# Patient Record
Sex: OTHER/UNKNOWN | Employment: FULL TIME | URBAN - NONMETROPOLITAN AREA
[De-identification: names, ages, dates, MRNs, and addresses within clinical notes are randomized per-mention and may not be internally consistent; named-entity substitution may affect disease eponyms.]

---

## 2017-04-05 ENCOUNTER — OFFICE VISIT (OUTPATIENT)
Dept: FAMILY MEDICINE | Facility: CLINIC | Age: 82
End: 2017-04-05

## 2017-04-05 DIAGNOSIS — Z02.89 HEALTH EXAMINATION OF DEFINED SUBPOPULATION: Primary | ICD-10-CM

## 2017-04-05 LAB
BILIRUB UR QL: NORMAL
CLARITY: CLEAR
COLOR UR: YELLOW
GLUCOSE URINE: NORMAL MG/DL
HGB UR QL: NORMAL
KETONES UR QL: NORMAL MG/DL
NITRITE UR QL STRIP: NORMAL
PH UR STRIP: 6 PH (ref 5–7)
PROT UR QL: NORMAL MG/DL
SP GR UR STRIP: 1 (ref 1–1.03)
SPECIMEN VOL UR: NORMAL ML
UROBILINOGEN UR QL STRIP: 0.2 EU/DL (ref 0.2–1)
WBC #/AREA URNS HPF: NORMAL /[HPF]

## 2017-04-05 PROCEDURE — 99499 UNLISTED E&M SERVICE: CPT | Performed by: FAMILY MEDICINE

## 2017-04-05 PROCEDURE — 81003 URINALYSIS AUTO W/O SCOPE: CPT | Performed by: FAMILY MEDICINE

## 2017-04-05 NOTE — MR AVS SNAPSHOT
"              After Visit Summary   2017    [employer]    MRN: [id number]           Patient Information     Date Of Birth          1900        Visit Information        Provider Department      2017 8:40 AM Julius Pena MD Formerly named [facility]        Today's Diagnoses     Health examination of defined subpopulation    -  1       Follow-ups after your visit        Who to contact     If you have questions or need follow up information about today's clinic visit or your schedule please contact [facility] directly at [phone].  Normal or non-critical lab and imaging results will be communicated to you by [employer], letter or phone within 4 business days after the clinic has received the results. If you do not hear from us within 7 days, please contact the clinic through [employer] or phone. If you have a critical or abnormal lab result, we will notify you by phone as soon as possible.  Submit refill requests through [employer] or call your pharmacy and they will forward the refill request to us. Please allow 3 business days for your refill to be completed.          Additional Information About Your Visit        MyChart Information     [employer] lets you send messages to your doctor, view your test results, renew your prescriptions, schedule appointments and more. To sign up, go to [email or web address] . Click on \"Log in\" on the left side of the screen, which will take you to the Welcome page. Then click on \"Sign up Now\" on the right side of the page.     You will be asked to enter the access code listed below, as well as some personal information. Please follow the directions to create your username and password.     Your access code is: [email or web address]  Expires: 2017 10:06 AM     Your access code will  in 90 days. If you need help or a new code, please call your [facility] or [phone].        Care EveryWhere ID     This is your Care EveryWhere " ID. This could be used by other organizations to access your [facility] medical records  [id number]         Blood Pressure from Last 3 Encounters:   No data found for BP    Weight from Last 3 Encounters:   No data found for Wt              We Performed the Following     OH U/A W/O MICRO        Primary Care Provider    Provider Unknown       No address on file        Thank you!     Thank you for choosing [facility]note 3]  for your care. Our goal is always to provide you with excellent care. Hearing back from our patients is one way we can continue to improve our services. Please take a few minutes to complete the written survey that you may receive in the mail after your visit with us. Thank you!             Your Updated Medication List - Protect others around you: Learn how to safely use, store and throw away your medicines at www.disposemymeds.org.      Notice  As of 4/5/2017 10:06 AM    You have not been prescribed any medications.

## 2017-04-05 NOTE — PROGRESS NOTES
"Form MCSA-5875 (revised 2015)                                       OMB No. [id number]  Expiration Date: 2018    MEDICAL EXAMINATION REPORT FORM  (FOR  MEDICAL CERTIFICATION)    SECTION 1.  Information (to be filled out by )    PERSONAL INFORMATION  Last Name: Tristan  First Name: Fritz Preston Initial: STELLA  : 1968      Age: 48        [address] Address: [address]   City: [place]   State/Province: [place]   Zip Code: [address]  's License Number: [id number]      Issuing State/Province: [place]       Phone: [phone]     Gender: male  E-mail (optional):   CLP/CDL Applicant Good*: yes   ID Verified by**:  License  Has your [employer]OT/FMCSA medical certificate ever been denied or issued for less than 2 years? NO   (*CLP/CDL Applicant/Good: See instructions for definitions)  (** ID verified By:Record what type of photo ID was used to verify the identity of the , e.g., CDL,'s license, passport)     HEALTH HISTORY  Have you ever had surgery? If \"yes\", please list and explain below. NO    Are you currently taking medications (prescription, over-the-counter, herbal remedies, diet supplements)? If \"yes,\" please describe below. NO    Do you have or have you ever had:     1. Head/ brain injuries or illnesses (e.g., concussion)    NO     2. Seizures, epilepsy?   NO     3. Eye problems (except glasses or contacts)?   NO     4. Ear and/or hearing problems   NO     5. Heart disease, heart attack; bypass, or other heart problems   NO     6. Pacemaker, stents, implantable devices, or other heart procedures   NO     7. High blood pressure   NO     8. High cholesterol   NO     9. Chronic (long-term) cough, shortness of breath, or other breathing problems   NO   10. Lung disease (e.g. asthma)   NO   11. Kidney problems, kidney stones, or pain/problems with urination   NO   12. Stomach, liver, or digestive problems   NO   13. Diabetes or blood sugar " "problems        Insulin Used?   NO    NO   14. Anxiety, depression, nervousness, other mental health problems   NO   15. Fainting or passing out   NO   16. Dizziness, headaches, numbness, tingling, or memory loss?   NO   17. Unexplained weight loss   NO   18. Stroke, mini-stroke (TIA), paralysis, or weakness   NO   19. Missing or limited use of arm, hand, finger, leg, foot, toe   NO   20. Neck or back problems   NO   21. Bone, muscle, joint or nerve problems   NO   22. Blood clots or bleeding problems   NO   23. Cancer   NO   24. Chronic (long-term) infection or other chronic diseases   NO   25. Sleep disorders, pauses in breathing while asleep, daytime sleepiness, loud snoring?   NO   26. Have you ever had a sleep test (e.g. sleep apnea)?   NO   27. Have you ever spent a night in the hospital?   NO   28. Have you ever had a broken bone?   YES   29. Have you ever used or do you now use tobacco?   YES   30. Do you currently drink alcohol?   YES   31. Have you used an illegal substance within the past two years?   NO   32. Have you ever failed a drug test or been dependent on an illegal substance?   NO     Other health condition(s) not described above: NO    Did you answer \"yes\" to any of questions 1-32?  If so, please comment further on those health conditions below: YES    Broken rib              'S SIGNATURE  I certify that the above information is accurate and complete. I understand that inaccurate, false or missing information may invalidate the examination and my Medical Examiner's Certificate, that submission of fraudulent or intentionally false information is a violation of 49CFR 390.35, and that submission of fraudulent or intentionally false information may subject me to civil or ciminal penalties under 49 .37 and 49  Appendices A and B.     's signature:____________________________        Date: 4/5/2017                                         Signature if printed   " "    Section 2. Examination Report (to be filled out by the medical examiner)      HEALTH HISTORY REVIEW  Review and discuss pertinent  answers and any available medical records. Comment on the 's responses to the \"health history\" questions that may affect the 's safe operation of a commercial motor vehicle (CMV).        couple beers a day.              TESTING     Pulse rate: 62     Pulse rhythm regular: YES  Height: 5 feet 10 inches  Weight: 180 pounds    Blood Pressure  Blood Pressure: 124 Systolic  82 Diastolic  Sitting yes  Second Reading (optional)   Other Testing if indicated           Urinalysis  Urinalysis is required. Numerical readings must be recorded.  Urine Specimen Specific Gravity Protein Blood Sugar    1.005 NEGATIVE NEGATIVE NEGATIVE   Protein, blood or sugar in the urine may be an indication for further testing to rule out any underlying medical problem.    Vision  Standard is at least 20/40 acuity (Snellen) in each eye with or without correction. At least 70  field of vision in horizontal meridian measured in each eye. The use of corrective lenses should be noted on the Medical Examiner's Certificate.    ACUITY UNCORRECTED CORRECTED HORIZONTAL FIELD OF VISION   Right Eye 20/20 N/A Greater than 70 degrees   Left Eye 20/20 N/A Greater than 70 degrees   Both Eyes 20/20 N/A      Applicant can recognize and distinguish among traffic control signals and devices showing red, green and aide colors? Yes    Monocular vision: No    Referred to ophthalmologist or optometrist?  No    Received documentation from ophthalmologist or optometrist?  No    HEARING   Standard: Must first perceive forced whispered voice at not less than 5 feet OR average hearing loss of less than or equal to 40 dB, in better ear (with or without hearing aid).    Check if hearing aid used for test:  Neither    Whispered voice test:    Record the distance from the individual at which a forced whispered voice can " first be heard:        Right Ear: greater than 5 feet                  Left Ear: greater than 5 feet             PHYSICAL EXAMINATION  The presence of a certain condition may not necessarily disqualify a , particularly if the condition is controlled adequately, is not likely to worsen, or is readily amenable to treatment. Even if a condition does not disqualify a , the Medical Examiner may consider deferring the  temporarily. Also, the  should be advised to take the necessary steps to correct the condition as soon as possible, particularly if neglecting the condition, could result in more serious illness that might affect driving.    Check the body systems for abnormalities.  BODY SYSTEM Normal or Abnormal   1. General  Normal   2. Skin Normal   3. Eyes Normal   4. Ears Normal   5. Mouth/throat Normal   6. Cardiovascular Normal   7. Lungs/chest Normal   8. Abdomen Normal   9. Genito-urinary System including hernias Normal   10. Back/Spine Normal   11. Extremities/joints Normal   12. Neurological system including reflexes Normal   13. Gait Normal   14. Vascular system Normal     Discuss any abnormal answers in detail in the space below and indicate whether it would affect the 's ability to operate a CMV. Enter applicable item number before each comment.                 Please complete only one of the following (Federal or State) Medical Examiner Determination sections:    MEDICAL EXAMINER DETERMINATION (Federal)  Use this section for examinations performed in accordance with the Federal Motor Carrier Safety Regulations (49 ..49):    Meets standards in 49 .41; qualifies for 2-year certificate            If the  meets the standards outlined in 49 .41, then complete a Medical Examiner's Certificate as stated in 49 .43(h), as appropriate.     I have performed this evaluation for certification. I have personally reviewed all available records and  recorded information pertaining to this evaluation, and attest that to the best of my knowledge, I believe it to be true and correct.    Medical Examiner's Signature: _________________________________________                                                                                   (if printed)    Medical Examiner's Name: Julius Pena MD  Medical Examiner's Address:   [address] [address]  [phone]  Dept: [phone]    Date Certificate Signed: 4/5/2017    Medical Examiner's State License, Certificate, or Registration Number: [id number]    Issuing State:  [place]    National Registry Number:  [id number]                Medical Examiner's Certificate Expiration Date: 4/5/2019                          Date submitted to registry: 4/5/2017  Submitted by: tracy

## 2018-12-13 ENCOUNTER — OFFICE VISIT (OUTPATIENT)
Dept: FAMILY MEDICINE | Facility: CLINIC | Age: 83
End: 2018-12-13

## 2018-12-13 DIAGNOSIS — Z02.89 HEALTH EXAMINATION OF DEFINED SUBPOPULATION: Primary | ICD-10-CM

## 2018-12-13 LAB
BILIRUB UR QL: NORMAL
CLARITY: CLEAR
COLOR UR: YELLOW
GLUCOSE URINE: 100 MG/DL
HGB UR QL: NORMAL
KETONES UR QL: NORMAL MG/DL
NITRITE UR QL STRIP: NORMAL
PH UR STRIP: 5.5 PH (ref 5–7)
PROT UR QL: NORMAL MG/DL
SP GR UR STRIP: 1.02 (ref 1–1.03)
SPECIMEN VOL UR: NORMAL ML
UROBILINOGEN UR QL STRIP: 0.2 EU/DL (ref 0.2–1)
WBC #/AREA URNS HPF: NORMAL /[HPF]

## 2018-12-13 PROCEDURE — 81003 URINALYSIS AUTO W/O SCOPE: CPT | Performed by: PHYSICIAN ASSISTANT

## 2018-12-13 PROCEDURE — 99499 UNLISTED E&M SERVICE: CPT | Performed by: PHYSICIAN ASSISTANT

## 2018-12-13 NOTE — PROGRESS NOTES
"Form MCSA-5875                                  [facility] No. [id number]  Expiration Date: 2019  MEDICAL EXAMINATION REPORT FORM  (FOR  MEDICAL CERTIFICATION)    SECTION 1.  Information (to be filled out by )    PERSONAL INFORMATION    Last Name: Lacho  First Name: Franck Wiggins Initial: D  : 1963      Age: 55        Street Address: [address] [place] [address]   City: [place]   State/Province: [place]   Zip Code: [address]  's License Number: [id number]      Issuing State/Province: [place]       Phone: [phone]     Gender: male  E-mail (optional): USHA  CLP/CDL Applicant Miller*: yes   ID Verified by**:  Checo Monreal CMA  Has your USDOT/FMCSA medical certificate ever been denied or issued for less than 2 years? YES     (*CLP/CDL Applicant/Miller: See instructions for definitions)  (** ID verified By:Record what type of photo ID was used to verify the identity of the , e.g., CDL,'s license, passport)       HEALTH HISTORY    Have you ever had surgery? If \"yes\", please list and explain below.  [ X ] Yes [  ]  No  [  ] Not Sure    Prostate cancer surgery     Are you currently taking medications (prescription, over-the-counter, herbal remedies, diet supplements)? If \"yes,\" please describe below.   [ X ] Yes [  ]  No  [  ] Not Sure    Generic Name Brand Name Tablet Size Instructions for use   Alprostadil (Vasodilator) EDEX 20 MCG 20 mcg by Intracavitary route as needed for erectile dysfunction (trimix standard PGE1 5.88ug/ papaverine HCL 18mg/ Phentolamine Mesylate 0.6 mg/ ML) use no more than 3 times per week      AmLODIPine Besylate NORVASC 10 MG Take 1 tablet (10 mg) by mouth daily      Aspirin ASA 81 MG Take  by mouth daily as needed.      Atorvastatin Calcium LIPITOR 40 MG Take 1 tablet (40 mg) by mouth daily      Blood Glucose Monitoring Suppl ACCU-CHEK COMPACT CARE KIT  Use once or twice daily as directed      Glimepiride AMARYL 4 MG TAKE 1 TABLET BY MOUTH " TWICE DAILY BEFORE BREAKFAST AND SUPPER      Glucose Blood ACCU-CHEK SMARTVIEW  TEST 1 TO 2 TIMES DAILY      Lancets DIABETIC STERILE LANCETS  1 Device daily.      Lisinopril PRINIVIL/ZESTRIL 40 MG TAKE 1 TABLET(40 MG) BY MOUTH DAILY      MetFORMIN HCl GLUCOPHAGE 1000 MG TAKE 1 TABLET(1000 MG) BY MOUTH TWICE DAILY BEFORE BREAKFAST AND DINNER WITH MEALS      Tadalafil CIALIS 20 MG Take 0.5-1 tablets (10-20 mg) by mouth daily as needed for erectile dysfunction Never use with nitroglycerin, terazosin or doxazosin.           HEALTH HISTORY (continued)          Do you have or have you ever had:                                                     Not  Yes    No     Sure                                                                          Not    Yes     No   Sure    1. Head/brain injuries or illness (e.g., concussion)  X     16. Dizziness, headaches, numbness, tingling, or memory loss  x       2. Seizures, epilepsy  X     17. Unexplained weight loss  x       3. Eye problems (except glasses or contacts)  X     18. Stroke, mini stroke (TIA), paralysis, or weakness  x       4. Ear and/or hearing problems  X     19. Missing or limited use of arm, hand, finger, leg, foot, toe  x       5. Heart disease, heart attack, bypass, or other heart problems  X     20. Neck or back problems  x       6. Pacemaker, stents, implantable devices, or other heart procedures  X     21. Bone, muscle, joint or nerve problems  x       7. High blood preassure x      22. Blood clots or bleeding problems  x       8. High cholesterol x      23. Cancer  x       9. Chronic (long term) cough, shortness of breath, or other breathing problems  x     24. Chronic (long term) infection or other chronic disease  x       10. Lung disease (e.g., asthma)  x     25. Sleep disorders, pauses in breathing while asleep, daytime sleepiness, loud snoring  x       11. Kidney problems, kidney stones, or pain/problems with urination  x     26. Have you ever had a  "sleep test? (e.g., sleep apnea)  x       12. Stomach, liver, or digestive problems  x     27. Have you ever spent the night in the hospital? x        13. Diabetes or blood sugar problems x      28. Have you ever had a broken bone? x              Insulin used  x     29. Have you ever used or do you now use tobacco?  x       14. Anxiety, depression, nervousness, other mental health problems  x     30. Do you currently drink alcohol?  x        15. Fainting or passing out  x     31.  Have you used an illegal substance within the past two years?   x         32. Have you ever failed a drug test or been dependent on an illegal substance?   x         Other health condition(s) not described above: [  ] Yes [ X ]  No  [  ] Not Sure         Did you answer \"yes\" to any of questions 1-32?  If so, please comment further on those health conditions below: [ X ] Yes [  ]  No  [  ] Not Sure    7. Takes high blood pressure medication  8. Takes high cholesterol medication  9. Takes diabetes medication and checks blood sugar            'S SIGNATURE  I certify that the above information is accurate and complete. I understand that inaccurate, false or missing information may invalidate the examination and my Medical Examiner's Certificate, that submission of fraudulent or intentionally false information is a violation of 49CFR 390.35, and that submission of fraudulent or intentionally false information may subject me to civil or ciminal penalties under 49 .37 and 49  Appendices A and B.     's signature:____________________________        Date: 12/13/2018                                         Signature if printed       Section 2. Examination Report (to be filled out by the medical examiner)      HEALTH HISTORY REVIEW  Review and discuss pertinent  answers and any available medical records. Comment on the 's responses to the \"health history\" questions that may affect the 's safe " operation of a commercial motor vehicle (CMV).        stable HTN, hyperlipidemia and DM on oral medications . Last A1C is 7.9 on 10/21/18  Conditions will not interfere with safe driving            TESTING     Pulse rate: 75     Pulse rhythm regular: YES  Height: 5 feet 8.5 inches  Weight: 222 pounds    Blood Pressure  Blood Pressure: 128 Systolic  87 Diastolic  Sitting YES  Second Reading (optional) none  Other Testing if indicated    none       Urinalysis  Urinalysis is required. Numerical readings must be recorded.  Urine Specimen Specific Gravity Protein Blood Sugar    1.025 NEGATIVE NEGATIVE NEGATIVE   Protein, blood or sugar in the urine may be an indication for further testing to rule out any underlying medical problem.    Vision  Standard is at least 20/40 acuity (Snellen) in each eye with or without correction. At least 70  field of vision in horizontal meridian measured in each eye. The use of corrective lenses should be noted on the Medical Examiner's Certificate.    ACUITY UNCORRECTED CORRECTED HORIZONTAL FIELD OF VISION   Right Eye N/A 20/20 Greater than 70 degrees   Left Eye N/A 20/25 Greater than 70 degrees   Both Eyes N/A 20/20      Applicant can recognize and distinguish among traffic control signals and devices showing red, green and winnie colors? Yes    Monocular vision: No    Referred to ophthalmologist or optometrist?  No    Received documentation from ophthalmologist or optometrist?  No    HEARING   Standard: Must first perceive forced whispered voice at not less than 5 feet OR average hearing loss of less than or equal to 40 dB, in better ear (with or without hearing aid).    Check if hearing aid used for test:  Neither    Whispered voice test:    Record the distance from the individual at which a forced whispered voice can first be heard:        Right Ear: greater than 5 feet                  Left Ear: greater than 5 feet             PHYSICAL EXAMINATION  The presence of a certain condition  may not necessarily disqualify a , particularly if the condition is controlled adequately, is not likely to worsen, or is readily amenable to treatment. Even if a condition does not disqualify a , the Medical Examiner may consider deferring the  temporarily. Also, the  should be advised to take the necessary steps to correct the condition as soon as possible, particularly if neglecting the condition, could result in more serious illness that might affect driving.    Check the body systems for abnormalities.  BODY SYSTEM Normal or Abnormal   1. General  Normal   2. Skin Normal   3. Eyes Normal   4. Ears Normal   5. Mouth/throat Normal   6. Cardiovascular Normal   7. Lungs/chest Normal   8. Abdomen Normal   9. Genito-urinary System including hernias Normal   10. Back/Spine Normal   11. Extremities/joints Normal   12. Neurological system including reflexes Normal   13. Gait Normal   14. Vascular system Normal     Discuss any abnormal answers in detail in the space below and indicate whether it would affect the 's ability to operate a CMV. Enter applicable item number before each comment.     normal exam            Please complete only one of the following (Federal or State) Medical Examiner Determination sections:    MEDICAL EXAMINER DETERMINATION (Federal)  Use this section for examinations performed in accordance with the Federal Motor Carrier Safety Regulations (49 ..49):    Meets standards, but periodic monitoring required:   Annual due to HTN, DM type II               If the  meets the standards outlined in 49 .41, then complete a Medical Examiner's Certificate as stated in 49 .43(h), as appropriate.     I have performed this evaluation for certification. I have personally reviewed all available records and recorded information pertaining to this evaluation, and attest that to the best of my knowledge, I believe it to be true and correct.    Medical  Examiner's Signature: _________________________________________                                                                                   (if printed)    Medical Examiner's Name: Lianne Barragan PA-C  Medical Examiner's Address:   [address] [address]  [phone]  Dept: [phone]    Date Certificate Signed: 12/13/18    Medical Examiner's State License, Certificate, or Registration Number: [id number]    Issuing State:  [place]    Physician Assistant    National Registry Number:  [id number]                Medical Examiner's Certificate Expiration Date: 12/13/2019                          Date submitted to registry: 12/13/2018  Submitted by: Apoorva BAKER

## 2019-02-27 ENCOUNTER — OFFICE VISIT (OUTPATIENT)
Dept: FAMILY MEDICINE | Facility: CLINIC | Age: 84
End: 2019-02-27

## 2019-02-27 DIAGNOSIS — Z02.89 HEALTH EXAMINATION OF DEFINED SUBPOPULATION: Primary | ICD-10-CM

## 2019-02-27 LAB
BILIRUB UR QL: NORMAL
CLARITY: CLEAR
COLOR UR: YELLOW
GLUCOSE URINE: NORMAL MG/DL
HGB UR QL: NORMAL
KETONES UR QL: NORMAL MG/DL
NITRITE UR QL STRIP: NORMAL
PH UR STRIP: 7 PH (ref 5–7)
PROT UR QL: NORMAL MG/DL
SP GR UR STRIP: 1.01 (ref 1–1.03)
SPECIMEN VOL UR: NORMAL ML
UROBILINOGEN UR QL STRIP: 0.2 EU/DL (ref 0.2–1)
WBC #/AREA URNS HPF: NORMAL /[HPF]

## 2019-02-27 PROCEDURE — 81003 URINALYSIS AUTO W/O SCOPE: CPT | Performed by: FAMILY MEDICINE

## 2019-02-27 PROCEDURE — 99499 UNLISTED E&M SERVICE: CPT | Performed by: FAMILY MEDICINE

## 2019-02-27 NOTE — PROGRESS NOTES
"Form [id number]-5875                                  [facility] [id number]  Expiration Date: 2019  MEDICAL EXAMINATION REPORT FORM  (FOR  MEDICAL CERTIFICATION)    SECTION 1.  Information (to be filled out by )    PERSONAL INFORMATION    Last Name: Tristan  First Name: Fritz Preston Initial: STELLA  : 1968      Age: 50        Street Address: [address]    City: [place]   State/Province: [place]   Zip Code: [address]  's License Number: [id number]      Issuing State/Province: [place]       Phone: [phone]     Gender: male  E-mail (optional):   CLP/CDL Applicant Good*: yes   ID Verified by**:    Has your [employer] medical certificate ever been denied or issued for less than 2 years? NO     (*CLP/CDL Applicant/Good: See instructions for definitions)  (** ID verified By:Record what type of photo ID was used to verify the identity of the , e.g., CDL,'s license, passport)       HEALTH HISTORY    Have you ever had surgery? If \"yes\", please list and explain below.  [  ] Yes [ x ]  No  [  ] Not Sure         Are you currently taking medications (prescription, over-the-counter, herbal remedies, diet supplements)? If \"yes,\" please describe below.   [  ] Yes [  ]  No  [  ] Not Sure          HEALTH HISTORY (continued)          Do you have or have you ever had:                                                     Not  Yes    No     Sure                                                                          Not    Yes     No   Sure    1. Head/brain injuries or illness (e.g., concussion)  x     16. Dizziness, headaches, numbness, tingling, or memory loss  x       2. Seizures, epilepsy  x     17. Unexplained weight loss  x       3. Eye problems (except glasses or contacts)  x     18. Stroke, mini stroke (TIA), paralysis, or weakness  x       4. Ear and/or hearing problems  x     19. Missing or limited use of arm, hand, finger, leg, foot, toe  x       5. " "Heart disease, heart attack, bypass, or other heart problems  x     20. Neck or back problems  x       6. Pacemaker, stents, implantable devices, or other heart procedures  x     21. Bone, muscle, joint or nerve problems  x       7. High blood preassure  x     22. Blood clots or bleeding problems  x       8. High cholesterol  x     23. Cancer  x       9. Chronic (long term) cough, shortness of breath, or other breathing problems  x     24. Chronic (long term) infection or other chronic disease  x       10. Lung disease (e.g., asthma)  x     25. Sleep disorders, pauses in breathing while asleep, daytime sleepiness, loud snoring  x       11. Kidney problems, kidney stones, or pain/problems with urination  x     26. Have you ever had a sleep test? (e.g., sleep apnea)  x       12. Stomach, liver, or digestive problems  x     27. Have you ever spent the night in the hospital? x        13. Diabetes or blood sugar problems  x     28. Have you ever had a broken bone? x              Insulin used  x     29. Have you ever used or do you now use tobacco? x        14. Anxiety, depression, nervousness, other mental health problems  x     30. Do you currently drink alcohol?  x        15. Fainting or passing out  x     31.  Have you used an illegal substance within the past two years?   x         32. Have you ever failed a drug test or been dependent on an illegal substance?   x         Other health condition(s) not described above: [  ] Yes [x  ]  No  [  ] Not Sure         Did you answer \"yes\" to any of questions 1-32?  If so, please comment further on those health conditions below: [  ] Yes [  ]  No  [  ] Not Sure    - Broke Rib            'S SIGNATURE  I certify that the above information is accurate and complete. I understand that inaccurate, false or missing information may invalidate the examination and my Medical Examiner's Certificate, that submission of fraudulent or intentionally false information is a violation " "of 49CFR 390.35, and that submission of fraudulent or intentionally false information may subject me to civil or ciminal penalties under 49 .37 and 49  Appendices A and B.     's signature:____________________________        Date: 2/27/2019                                         Signature if printed       Section 2. Examination Report (to be filled out by the medical examiner)      HEALTH HISTORY REVIEW  Review and discuss pertinent  answers and any available medical records. Comment on the 's responses to the \"health history\" questions that may affect the 's safe operation of a commercial motor vehicle (CMV).        no issues            TESTING     Pulse rate: 56     Pulse rhythm regular: YES  Height: 5 feet 10.5 inches  Weight: 179.8 pounds    Blood Pressure  Blood Pressure: 124 Systolic  84 Diastolic  Sitting YES  Second Reading (optional) NA  Other Testing if indicated           Urinalysis  Urinalysis is required. Numerical readings must be recorded.  Urine Specimen Specific Gravity Protein Blood Sugar    1.010 NEGATIVE NEGATIVE NEGATIVE   Protein, blood or sugar in the urine may be an indication for further testing to rule out any underlying medical problem.    Vision  Standard is at least 20/40 acuity (Snellen) in each eye with or without correction. At least 70  field of vision in horizontal meridian measured in each eye. The use of corrective lenses should be noted on the Medical Examiner's Certificate.    ACUITY UNCORRECTED CORRECTED HORIZONTAL FIELD OF VISION   Right Eye 20/20  Greater than 70 degrees   Left Eye 20/20  Greater than 70 degrees   Both Eyes 20/20       Applicant can recognize and distinguish among traffic control signals and devices showing red, green and aide colors? Yes    Monocular vision: No    Referred to ophthalmologist or optometrist?  No    Received documentation from ophthalmologist or optometrist?  No    HEARING   Standard: Must " first perceive forced whispered voice at not less than 5 feet OR average hearing loss of less than or equal to 40 dB, in better ear (with or without hearing aid).    Check if hearing aid used for test:  Neither    Whispered voice test:    Record the distance from the individual at which a forced whispered voice can first be heard:        Right Ear: greater than 5 feet                  Left Ear: greater than 5 feet             PHYSICAL EXAMINATION  The presence of a certain condition may not necessarily disqualify a , particularly if the condition is controlled adequately, is not likely to worsen, or is readily amenable to treatment. Even if a condition does not disqualify a , the Medical Examiner may consider deferring the  temporarily. Also, the  should be advised to take the necessary steps to correct the condition as soon as possible, particularly if neglecting the condition, could result in more serious illness that might affect driving.    Check the body systems for abnormalities.  BODY SYSTEM Normal or Abnormal   1. General  Normal   2. Skin Normal   3. Eyes Normal   4. Ears Normal   5. Mouth/throat Normal   6. Cardiovascular Normal   7. Lungs/chest Normal   8. Abdomen Normal   9. Genito-urinary System including hernias Normal   10. Back/Spine Normal   11. Extremities/joints Normal   12. Neurological system including reflexes Normal   13. Gait Normal   14. Vascular system Normal     Discuss any abnormal answers in detail in the space below and indicate whether it would affect the 's ability to operate a CMV. Enter applicable item number before each comment.                 Please complete only one of the following (Federal or State) Medical Examiner Determination sections:    MEDICAL EXAMINER DETERMINATION (Federal)  Use this section for examinations performed in accordance with the Federal Motor Carrier Safety Regulations (49 ..49):    Meets standards in 49 CFR  391.41; qualifies for 2-year certificate            If the  meets the standards outlined in 49 .41, then complete a Medical Examiner's Certificate as stated in 49 .43(h), as appropriate.     I have performed this evaluation for certification. I have personally reviewed all available records and recorded information pertaining to this evaluation, and attest that to the best of my knowledge, I believe it to be true and correct.    Medical Examiner's Signature: _________________________________________                                                                                   (if printed)    Medical Examiner's Name: Julius Pena MD  Medical Examiner's Address:   [address] [id number] [phone]  Dept: [phone]    Date Certificate Signed: 2/27/2019    Medical Examiner's State License, Certificate, or Registration Number: [id number]    Issuing State[place]    National Registry Number:  [id number]                Medical Examiner's Certificate Expiration Date: 2/27/21                          Date submitted to registry: 02/27/2019  Submitted by: . Anastasia Lay MA  3:51 PM 2/27/2019

## 2019-10-31 ENCOUNTER — OFFICE VISIT (OUTPATIENT)
Dept: FAMILY MEDICINE | Facility: CLINIC | Age: 84
End: 2019-10-31

## 2019-10-31 DIAGNOSIS — Z02.89 HEALTH EXAMINATION OF DEFINED SUBPOPULATION: Primary | ICD-10-CM

## 2019-10-31 LAB
BILIRUB UR QL: NORMAL
CLARITY: CLEAR
COLOR UR: YELLOW
GLUCOSE URINE: NORMAL MG/DL
HGB UR QL: NORMAL
KETONES UR QL: NORMAL MG/DL
NITRITE UR QL STRIP: NORMAL
PH UR STRIP: 6.5 PH (ref 5–7)
PROT UR QL: NORMAL MG/DL
SP GR UR STRIP: 1.01 (ref 1–1.03)
SPECIMEN VOL UR: NORMAL ML
UROBILINOGEN UR QL STRIP: 0.2 EU/DL (ref 0.2–1)
WBC #/AREA URNS HPF: NORMAL /[HPF]

## 2019-10-31 PROCEDURE — 81003 URINALYSIS AUTO W/O SCOPE: CPT | Performed by: PHYSICIAN ASSISTANT

## 2019-10-31 PROCEDURE — 99499 UNLISTED E&M SERVICE: CPT | Performed by: PHYSICIAN ASSISTANT

## 2019-10-31 NOTE — PROGRESS NOTES
"Form [id number]-5875                                  [facility] [id number]  Expiration Date: 2019  MEDICAL EXAMINATION REPORT FORM  (FOR  MEDICAL CERTIFICATION)    SECTION 1.  Information (to be filled out by )    PERSONAL INFORMATION    Last Name: Lacho  First Name: Franck Wiggins Initial: D  : 1963      Age: 56        Street Address: [address] [place] [address]   City: [place]   State/Province: [place]   Zip Code: [address]  's License Number: [id number]      Issuing State/Province: [place]       Phone: [phone]     Gender: male  E-mail (optional): n/a  CLP/CDL Applicant Miller*: yes   ID Verified by**:  Drivers license Veronika LYONS    Has your [employer]OT/FMCSA medical certificate ever been denied or issued for less than 2 years? YES     (*CLP/CDL Applicant/Miller: See instructions for definitions)  (** ID verified By:Record what type of photo ID was used to verify the identity of the , e.g., CDL,'s license, passport)       HEALTH HISTORY    Have you ever had surgery? If \"yes\", please list and explain below.  [  x] Yes [  ]  No  [  ] Not Sure    Prostate      Are you currently taking medications (prescription, over-the-counter, herbal remedies, diet supplements)? If \"yes,\" please describe below.   [ x ] Yes [  ]  No  [  ] Not Sure    Amlopine  atorvastatin   Glimepiride  Lisinopril   Metformin         HEALTH HISTORY (continued)          Do you have or have you ever had:                                                     Not  Yes    No     Sure                                                                          Not    Yes     No   Sure    1. Head/brain injuries or illness (e.g., concussion)  x     16. Dizziness, headaches, numbness, tingling, or memory loss  x       2. Seizures, epilepsy  x     17. Unexplained weight loss  x       3. Eye problems (except glasses or contacts)  x     18. Stroke, mini stroke (TIA), paralysis, or weakness  x       4. Ear " "and/or hearing problems  x     19. Missing or limited use of arm, hand, finger, leg, foot, toe  x       5. Heart disease, heart attack, bypass, or other heart problems  x     20. Neck or back problems  x       6. Pacemaker, stents, implantable devices, or other heart procedures  x     21. Bone, muscle, joint or nerve problems  x       7. High blood preassure x      22. Blood clots or bleeding problems  x       8. High cholesterol x      23. Cancer x        9. Chronic (long term) cough, shortness of breath, or other breathing problems  x     24. Chronic (long term) infection or other chronic disease  x       10. Lung disease (e.g., asthma)  x     25. Sleep disorders, pauses in breathing while asleep, daytime sleepiness, loud snoring  x       11. Kidney problems, kidney stones, or pain/problems with urination  x     26. Have you ever had a sleep test? (e.g., sleep apnea)  x       12. Stomach, liver, or digestive problems  x     27. Have you ever spent the night in the hospital? x        13. Diabetes or blood sugar problems x      28. Have you ever had a broken bone? x              Insulin used  x     29. Have you ever used or do you now use tobacco?  x       14. Anxiety, depression, nervousness, other mental health problems  x     30. Do you currently drink alcohol?  x        15. Fainting or passing out  x     31.  Have you used an illegal substance within the past two years?   x         32. Have you ever failed a drug test or been dependent on an illegal substance?   x         Other health condition(s) not described above: [  ] Yes [x  ]  No  [  ] Not Sure         Did you answer \"yes\" to any of questions 1-32?  If so, please comment further on those health conditions below: [x  ] Yes [  ]  No  [  ] Not Sure    For an operation (prostate), light drinking, taking blood pressure, diabetes, and cholesterol medications. Broken ankle            'S SIGNATURE  I certify that the above information is accurate and " "complete. I understand that inaccurate, false or missing information may invalidate the examination and my Medical Examiner's Certificate, that submission of fraudulent or intentionally false information is a violation of 49CFR 390.35, and that submission of fraudulent or intentionally false information may subject me to civil or ciminal penalties under 49 .37 and 49  Appendices A and B.     's signature:____________________________        Date: 10/31/2019                                         Signature if printed       Section 2. Examination Report (to be filled out by the medical examiner)      HEALTH HISTORY REVIEW  Review and discuss pertinent  answers and any available medical records. Comment on the 's responses to the \"health history\" questions that may affect the 's safe operation of a commercial motor vehicle (CMV).        DM-on oral medications-stable A1C is 7.7, no h/o hypoglycemia  HTN stable on medications  Hyperlipidemia stable  The above conditions will not interfere with driving safety            TESTING     Pulse rate: 84     Pulse rhythm regular: YES  Height: 5 feet 9 inches  Weight: 219.2 pounds    Blood Pressure  Blood Pressure: 129 Systolic  87 Diastolic  Sitting yes  Second Reading (optional) n/a  Other Testing if indicated    none       Urinalysis  Urinalysis is required. Numerical readings must be recorded.  Urine Specimen Specific Gravity Protein Blood Sugar    1.015 NEGATIVE NEGATIVE NEGATIVE   Protein, blood or sugar in the urine may be an indication for further testing to rule out any underlying medical problem.    Vision  Standard is at least 20/40 acuity (Snellen) in each eye with or without correction. At least 70  field of vision in horizontal meridian measured in each eye. The use of corrective lenses should be noted on the Medical Examiner's Certificate.    ACUITY UNCORRECTED CORRECTED HORIZONTAL FIELD OF VISION   Right Eye N/A " 20/20 Greater than 70 degrees   Left Eye N/A 20/20 Greater than 70 degrees   Both Eyes N/A 20/20      Applicant can recognize and distinguish among traffic control signals and devices showing red, green and winnie colors? Yes    Monocular vision: No    Referred to ophthalmologist or optometrist?  No    Received documentation from ophthalmologist or optometrist?  No    HEARING   Standard: Must first perceive forced whispered voice at not less than 5 feet OR average hearing loss of less than or equal to 40 dB, in better ear (with or without hearing aid).    Check if hearing aid used for test:  Neither    Whispered voice test:    Record the distance from the individual at which a forced whispered voice can first be heard:        Right Ear: greater than 5 feet                  Left Ear: greater than 5 feet             PHYSICAL EXAMINATION  The presence of a certain condition may not necessarily disqualify a , particularly if the condition is controlled adequately, is not likely to worsen, or is readily amenable to treatment. Even if a condition does not disqualify a , the Medical Examiner may consider deferring the  temporarily. Also, the  should be advised to take the necessary steps to correct the condition as soon as possible, particularly if neglecting the condition, could result in more serious illness that might affect driving.    Check the body systems for abnormalities.  BODY SYSTEM Normal or Abnormal   1. General  Normal   2. Skin Normal   3. Eyes Normal   4. Ears Normal   5. Mouth/throat Normal   6. Cardiovascular Normal   7. Lungs/chest Normal   8. Abdomen Normal   9. Genito-urinary System including hernias Normal   10. Back/Spine Normal   11. Extremities/joints Normal   12. Neurological system including reflexes Normal   13. Gait Normal   14. Vascular system Normal     Discuss any abnormal answers in detail in the space below and indicate whether it would affect the 's ability to  operate a CMV. Enter applicable item number before each comment.     normal exam            Please complete only one of the following (Federal or State) Medical Examiner Determination sections:    MEDICAL EXAMINER DETERMINATION (Federal)  Use this section for examinations performed in accordance with the Federal Motor Carrier Safety Regulations (49 ..49):    Meets standards, but periodic monitoring required:   Annual DM, HTN               If the  meets the standards outlined in 49 .41, then complete a Medical Examiner's Certificate as stated in 49 .43(h), as appropriate.     I have performed this evaluation for certification. I have personally reviewed all available records and recorded information pertaining to this evaluation, and attest that to the best of my knowledge, I believe it to be true and correct.    Medical Examiner's Signature: _________________________________________                                                                                   (if printed)    Medical Examiner's Name: Lianne Barragan PA-C  Medical Examiner's Address:   [address] [address]  [phone]  Dept: [phone]    Date Certificate Signed: 10/31/2019    Medical Examiner's State License, Certificate, or Registration Number: [id number]    Issuing State:  [place]    Physician Assistant    National Registry Number:  [id number]                Medical Examiner's Certificate Expiration Date: 10/31/2020                          Date submitted to registry: 10/31/2019  Submitted by: angeles tidwell MA

## 2020-03-11 ENCOUNTER — OFFICE VISIT (OUTPATIENT)
Dept: FAMILY MEDICINE | Facility: CLINIC | Age: 85
End: 2020-03-11

## 2020-03-11 DIAGNOSIS — Z02.89 HEALTH EXAMINATION OF DEFINED SUBPOPULATION: Primary | ICD-10-CM

## 2020-03-11 LAB
BILIRUB UR QL: NORMAL
CLARITY: CLEAR
COLOR UR: YELLOW
GLUCOSE URINE: NORMAL MG/DL
HGB UR QL: NORMAL
KETONES UR QL: NORMAL MG/DL
NITRITE UR QL STRIP: NORMAL
PH UR STRIP: 5.5 PH (ref 5–7)
PROT UR QL: NORMAL MG/DL
SP GR UR STRIP: 1.03 (ref 1–1.03)
SPECIMEN VOL UR: NORMAL ML
UROBILINOGEN UR QL STRIP: 0.2 EU/DL (ref 0.2–1)
WBC #/AREA URNS HPF: NORMAL /[HPF]

## 2020-03-11 PROCEDURE — 99499 UNLISTED E&M SERVICE: CPT | Performed by: PHYSICIAN ASSISTANT

## 2020-03-11 PROCEDURE — 81003 URINALYSIS AUTO W/O SCOPE: CPT | Performed by: PHYSICIAN ASSISTANT

## 2020-03-11 NOTE — PROGRESS NOTES
"Form [id number]-[id number]                                  [facility] No. [id number]  Expiration Date: 21  MEDICAL EXAMINATION REPORT FORM  (FOR  MEDICAL CERTIFICATION)    SECTION 1.  Information (to be filled out by )    PERSONAL INFORMATION    Last Name: Simone  First Name: Luis Wiggins Initial: A  : [birth year]      Age: 58        Street Address: [address]  City: [place]   State/Province: [place]   Zip Code: [address]  's License Number: [id number]      Issuing State/Province: [place]       Phone: [phone]     Gender: male  E-mail (optional): N/A  CLP/CDL Applicant Miller*: yes   ID Verified by**:  Rosemary Bobo  Has your [employer] medical certificate ever been denied or issued for less than 2 years? NO     (*CLP/CDL Applicant/Miller: See instructions for definitions)  (** ID verified By:Record what type of photo ID was used to verify the identity of the , e.g., CDL,'s license, passport)       HEALTH HISTORY    Have you ever had surgery? If \"yes\", please list and explain below.  [ X ] Yes [  ]  No  [  ] Not Sure    Left Knee Replacement     Are you currently taking medications (prescription, over-the-counter, herbal remedies, diet supplements)? If \"yes,\" please describe below.   [ x ] Yes [  ]  No  [  ] Not Sure    sertraline      HEALTH HISTORY (continued)          Do you have or have you ever had:                                                     Not  Yes    No     Sure                                                                          Not    Yes     No   Sure    1. Head/brain injuries or illness (e.g., concussion)  X     16. Dizziness, headaches, numbness, tingling, or memory loss  X       2. Seizures, epilepsy  X     17. Unexplained weight loss  X       3. Eye problems (except glasses or contacts)  X     18. Stroke, mini stroke (TIA), paralysis, or weakness  X       4. Ear and/or hearing problems  X     19. Missing " "or limited use of arm, hand, finger, leg, foot, toe  X       5. Heart disease, heart attack, bypass, or other heart problems  X     20. Neck or back problems  X       6. Pacemaker, stents, implantable devices, or other heart procedures  X     21. Bone, muscle, joint or nerve problems  X       7. High blood preassure  X     22. Blood clots or bleeding problems  X       8. High cholesterol  X     23. Cancer  X       9. Chronic (long term) cough, shortness of breath, or other breathing problems  X     24. Chronic (long term) infection or other chronic disease  X       10. Lung disease (e.g., asthma)  X     25. Sleep disorders, pauses in breathing while asleep, daytime sleepiness, loud snoring  X       11. Kidney problems, kidney stones, or pain/problems with urination  X     26. Have you ever had a sleep test? (e.g., sleep apnea)  X       12. Stomach, liver, or digestive problems  X     27. Have you ever spent the night in the hospital? X        13. Diabetes or blood sugar problems  X     28. Have you ever had a broken bone?  X             Insulin used  X     29. Have you ever used or do you now use tobacco? X        14. Anxiety, depression, nervousness, other mental health problems X      30. Do you currently drink alcohol?   X       15. Fainting or passing out  X     31.  Have you used an illegal substance within the past two years?   X         32. Have you ever failed a drug test or been dependent on an illegal substance?   X         Other health condition(s) not described above: [  ] Yes [ X ]  No  [  ] Not Sure         Did you answer \"yes\" to any of questions 1-32?  If so, please comment further on those health conditions below: [ x ] Yes [  ]  No  [  ] Not Sure    depression            'S SIGNATURE  I certify that the above information is accurate and complete. I understand that inaccurate, false or missing information may invalidate the examination and my Medical Examiner's Certificate, that submission " "of fraudulent or intentionally false information is a violation of 49CFR 390.35, and that submission of fraudulent or intentionally false information may subject me to civil or ciminal penalties under 49 .37 and 49  Appendices A and B.     's signature:____________________________        Date: 3/11/2020                                         Signature if printed       Section 2. Examination Report (to be filled out by the medical examiner)      HEALTH HISTORY REVIEW  Review and discuss pertinent  answers and any available medical records. Comment on the 's responses to the \"health history\" questions that may affect the 's safe operation of a commercial motor vehicle (CMV).        patient reports stable mild depression-takes Sertraline 50 mg daily             TESTING     Pulse rate: 85     Pulse rhythm regular: YES  Height: 6 feet 0.5 inches  Weight: 260 pounds    Blood Pressure  Blood Pressure: 147 Systolic  90 Diastolic  Sitting YES  Second Reading (optional) 120/80  Other Testing if indicated    none       Urinalysis  Urinalysis is required. Numerical readings must be recorded.  Urine Specimen Specific Gravity Protein Blood Sugar    1.030 NEGATIVE NEGATIVE NEGATIVE   Protein, blood or sugar in the urine may be an indication for further testing to rule out any underlying medical problem.    Vision  Standard is at least 20/40 acuity (Snellen) in each eye with or without correction. At least 70  field of vision in horizontal meridian measured in each eye. The use of corrective lenses should be noted on the Medical Examiner's Certificate.    ACUITY UNCORRECTED CORRECTED HORIZONTAL FIELD OF VISION   Right Eye 20/20 N/A Greater than 70 degrees   Left Eye 20/20 N/A Greater than 70 degrees   Both Eyes 20/20 N/A      Applicant can recognize and distinguish among traffic control signals and devices showing red, green and winnie colors? [ X ] Yes [  ]  No    Monocular vision: " No    Referred to ophthalmologist or optometrist?  No    Received documentation from ophthalmologist or optometrist?  No    HEARING   Standard: Must first perceive forced whispered voice at not less than 5 feet OR average hearing loss of less than or equal to 40 dB, in better ear (with or without hearing aid).    Check if hearing aid used for test:  [  ] Right Ear [  ]  Left Ear [ x ] Neither    Whispered voice test:    Record the distance from the individual at which a forced whispered voice can first be heard:        Right Ear: greater than 5 feet                  Left Ear: greater than 5 feet             PHYSICAL EXAMINATION  The presence of a certain condition may not necessarily disqualify a , particularly if the condition is controlled adequately, is not likely to worsen, or is readily amenable to treatment. Even if a condition does not disqualify a , the Medical Examiner may consider deferring the  temporarily. Also, the  should be advised to take the necessary steps to correct the condition as soon as possible, particularly if neglecting the condition, could result in more serious illness that might affect driving.    Check the body systems for abnormalities.  BODY SYSTEM Normal or Abnormal   1. General  Normal   2. Skin Normal   3. Eyes Normal   4. Ears Normal   5. Mouth/throat Normal   6. Cardiovascular Normal   7. Lungs/chest Normal   8. Abdomen Normal   9. Genito-urinary System including hernias Normal   10. Back/Spine Normal   11. Extremities/joints Normal   12. Neurological system including reflexes Normal   13. Gait Normal   14. Vascular system Normal     Discuss any abnormal answers in detail in the space below and indicate whether it would affect the 's ability to operate a CMV. Enter applicable item number before each comment.     normal exam            Please complete only one of the following (Federal or State) Medical Examiner Determination sections:      MEDICAL  EXAMINER DETERMINATION (Federal)  Use this section for examinations performed in accordance with the Federal Motor Carrier Safety Regulations (49 ..49):    [  ] Does not meet standards (specify reason) ________________________________________________________________________________  [ x ] Meets standards in 49 .41; qualifies for 2-year certificate  [  ] Meets standards, but periodic monitoring required (specify reason) ___________________________________________________________         qualified for:   [  ] 3 months               [  ] 6 months               [  ] 1 year            [  ] other (specify): ________________________________  [  ]  Wearing corrective lenses        [  ] Wearing hearing aid        [  ] Accompanied by a waiver/exception(specify type): ____________________  [  ] Accompanied by a Skill Performance Evaluation (SPE) Certificate    [  ]  Qualified by operation of 49 .64 (Federal)  [  ] Driving within an exempt intracity zone (see 49 .62) (Federal)  [  ] Determination pending (specify reason) __________________________________________________________________________________        [  ] Return to medical exam office for follow-up on (must be 45 days or less _______________________________        [  ] Medical Examination Report amended (specify reason) ______________________________________________                    (if amended) Medical Examiner's Signature _____________________________________________________ Date: _______________  [  ] Incomplete examination (specify reason): _________________________________________________________________________________            If the  meets the standards outlined in 49 .41, then complete a Medical Examiner's Certificate as stated in 49 .43(h), as appropriate.     I have performed this evaluation for certification. I have personally reviewed all available records and recorded information pertaining  to this evaluation, and attest that to the best of my knowledge, I believe it to be true and correct.    Medical Examiner's Signature: _________________________________________                                                                                   (if printed)    Medical Examiner's Name: Lianne Barragan PA-C  Medical Examiner's Address:   [address] [address]  [phone]  Dept: [phone]    Date Certificate Signed: 03/11/2020    Medical Examiner's State License, Certificate, or Registration Number: [id number]    Issuing State:  [place]    [  ] [email or web address].   [  ] D.O.   [x  ] Physician Assistant   [  ] Chiropractor   [  ] Advanced Practice Nurse  [  ] Other Practitioner (specify) __________________________________________________    National Registry Number:  [id number]    Medical Examiner's Certificate Expiration Date: 03/11/2022                 Date submitted to registry: 03/11/2020  Submitted by: reagan tidwell ma

## 2020-10-29 ENCOUNTER — OFFICE VISIT (OUTPATIENT)
Dept: FAMILY MEDICINE | Facility: CLINIC | Age: 85
End: 2020-10-29

## 2020-10-29 DIAGNOSIS — Z02.89 HEALTH EXAMINATION OF DEFINED SUBPOPULATION: Primary | ICD-10-CM

## 2020-10-29 LAB
BILIRUB UR QL: NORMAL
CLARITY: CLEAR
COLOR UR: YELLOW
GLUCOSE URINE: NORMAL MG/DL
HGB UR QL: NORMAL
KETONES UR QL: NORMAL MG/DL
NITRITE UR QL STRIP: NORMAL
PH UR STRIP: 6 PH (ref 5–7)
PROT UR QL: 100 MG/DL
SP GR UR STRIP: 1.02 (ref 1–1.03)
SPECIMEN VOL UR: NORMAL ML
UROBILINOGEN UR QL STRIP: 0.2 EU/DL (ref 0.2–1)
WBC #/AREA URNS HPF: NORMAL /[HPF]

## 2020-10-29 PROCEDURE — 81003 URINALYSIS AUTO W/O SCOPE: CPT | Performed by: PHYSICIAN ASSISTANT

## 2020-10-29 PROCEDURE — 99499 UNLISTED E&M SERVICE: CPT | Performed by: PHYSICIAN ASSISTANT

## 2020-10-29 NOTE — PROGRESS NOTES
"Form [id number]                                  [facility] No. [id number]  Expiration Date: 21  MEDICAL EXAMINATION REPORT FORM  (FOR  MEDICAL CERTIFICATION)    SECTION 1.  Information (to be filled out by )    PERSONAL INFORMATION    Last Name: Lacho  First Name: Franck Wiggins Initial: D  : [birth year]      Age: 57        Street Address: [address] [place] [address]   City: [place]   State/Province: [place]   Zip Code: [address]  's License Number: [id number]      Issuing State/Province: [place]       Phone: [phone]     Gender: male  E-mail (optional): NA  CLP/CDL Applicant Miller: yes   ID Verified by:  Elma   Has your [employer]OT/FMCSA medical certificate ever been denied or issued for less than 2 years? YES     (*CLP/CDL Applicant/Miller: See instructions for definitions)  (** ID verified By:Record what type of photo ID was used to verify the identity of the , e.g., CDL,'s license, passport)       HEALTH HISTORY    Have you ever had surgery? If \"yes\", please list and explain below.  [ x ] Yes [  ]  No  [  ] Not Sure    Over the last 10 years, Prostate Cancer      Are you currently taking medications (prescription, over-the-counter, herbal remedies, diet supplements)? If \"yes,\" please describe below.   [ x ] Yes [  ]  No  [  ] Not Sure    Metformin 1000mcg, Atorvastatin, Glimepiride, Amlodipine, Lisinopril       HEALTH HISTORY (continued)          Do you have or have you ever had:                                                     Not  Yes    No     Sure                                                                          Not    Yes     No   Sure    1. Head/brain injuries or illness (e.g., concussion)  x     16. Dizziness, headaches, numbness, tingling, or memory loss  x       2. Seizures, epilepsy  x     17. Unexplained weight loss  x       3. Eye problems (except glasses or contacts)  x     18. Stroke, mini stroke (TIA), paralysis, or weakness  x     " "  4. Ear and/or hearing problems  x     19. Missing or limited use of arm, hand, finger, leg, foot, toe  x       5. Heart disease, heart attack, bypass, or other heart problems  x     20. Neck or back problems  x       6. Pacemaker, stents, implantable devices, or other heart procedures  x     21. Bone, muscle, joint or nerve problems  x       7. High blood preassure x      22. Blood clots or bleeding problems  x       8. High cholesterol  x     23. Cancer x        9. Chronic (long term) cough, shortness of breath, or other breathing problems  x     24. Chronic (long term) infection or other chronic disease  x       10. Lung disease (e.g., asthma)  x     25. Sleep disorders, pauses in breathing while asleep, daytime sleepiness, loud snoring  x       11. Kidney problems, kidney stones, or pain/problems with urination  x     26. Have you ever had a sleep test? (e.g., sleep apnea)  x       12. Stomach, liver, or digestive problems  x     27. Have you ever spent the night in the hospital?  x       13. Diabetes or blood sugar problems x      28. Have you ever had a broken bone?  x             Insulin used  x     29. Have you ever used or do you now use tobacco?  x       14. Anxiety, depression, nervousness, other mental health problems  x     30. Do you currently drink alcohol?   x       15. Fainting or passing out  x     31.  Have you used an illegal substance within the past two years?   x         32. Have you ever failed a drug test or been dependent on an illegal substance?   x         Other health condition(s) not described above: [  ] Yes [ x ]  No  [  ] Not Sure         Did you answer \"yes\" to any of questions 1-32?  If so, please comment further on those health conditions below: [  ] Yes [  ]  No  [  ] Not Sure    Prostate Cancer, Hypertension and prediabetic medication.             'S SIGNATURE  I certify that the above information is accurate and complete. I understand that inaccurate, false or " "missing information may invalidate the examination and my Medical Examiner's Certificate, that submission of fraudulent or intentionally false information is a violation of 49CFR 390.35, and that submission of fraudulent or intentionally false information may subject me to civil or ciminal penalties under 49 .37 and 49  Appendices A and B.     's signature:____________________________        Date: 10/29/2020                                         Signature if printed       Section 2. Examination Report (to be filled out by the medical examiner)      HEALTH HISTORY REVIEW  Review and discuss pertinent  answers and any available medical records. Comment on the 's responses to the \"health history\" questions that may affect the 's safe operation of a commercial motor vehicle (CMV).       patient reports HTN -stable on daily medications   DM II -stable on oral medications. A1C on 10/22/2020 is 7.7  H/o of prostate cancer-in remission            TESTING     Pulse rate: 98     Pulse rhythm regular: YES  Height: 5 feet 8.5 inches  Weight: 216 pounds    Blood Pressure  Blood Pressure: 143 Systolic  91 Diastolic  Sitting YES  Second Reading (optional) 131/ 89  Other Testing if indicated    none       Urinalysis  Urinalysis is required. Numerical readings must be recorded.  Urine Specimen Specific Gravity Protein Blood Sugar    1.025 NEGATIVE NEGATIVE NEGATIVE   Protein, blood or sugar in the urine may be an indication for further testing to rule out any underlying medical problem.    Vision  Standard is at least 20/40 acuity (Snellen) in each eye with or without correction. At least 70  field of vision in horizontal meridian measured in each eye. The use of corrective lenses should be noted on the Medical Examiner's Certificate.    ACUITY UNCORRECTED CORRECTED HORIZONTAL FIELD OF VISION   Right Eye N/A 20/20 Greater than 70 degrees   Left Eye N/A 20/25 Greater than 70 degrees "   Both Eyes N/A 20/20      Applicant can recognize and distinguish among traffic control signals and devices showing red, green and winnie colors? [ x ] Yes [  ]  No    Monocular vision: No    Referred to ophthalmologist or optometrist?  No    Received documentation from ophthalmologist or optometrist?  No    HEARING   Standard: Must first perceive forced whispered voice at not less than 5 feet OR average hearing loss of less than or equal to 40 dB, in better ear (with or without hearing aid).    Check if hearing aid used for test:  [  ] Right Ear [  ]  Left Ear [x  ] Neither    Whispered voice test:    Record the distance from the individual at which a forced whispered voice can first be heard:        Right Ear: greater than 5 feet                  Left Ear: greater than 5 feet             PHYSICAL EXAMINATION  The presence of a certain condition may not necessarily disqualify a , particularly if the condition is controlled adequately, is not likely to worsen, or is readily amenable to treatment. Even if a condition does not disqualify a , the Medical Examiner may consider deferring the  temporarily. Also, the  should be advised to take the necessary steps to correct the condition as soon as possible, particularly if neglecting the condition, could result in more serious illness that might affect driving.    Check the body systems for abnormalities.  BODY SYSTEM Normal or Abnormal   1. General  Normal   2. Skin Normal   3. Eyes Normal   4. Ears Normal   5. Mouth/throat Normal   6. Cardiovascular Normal   7. Lungs/chest Normal   8. Abdomen Normal   9. Genito-urinary System including hernias Normal   10. Back/Spine Normal   11. Extremities/joints Normal   12. Neurological system including reflexes Normal   13. Gait Normal   14. Vascular system Normal     Discuss any abnormal answers in detail in the space below and indicate whether it would affect the 's ability to operate a CMV. Enter  applicable item number before each comment.     normal exam            Please complete only one of the following (Federal or State) Medical Examiner Determination sections:      MEDICAL EXAMINER DETERMINATION (Federal)  Use this section for examinations performed in accordance with the Federal Motor Carrier Safety Regulations (49 ..49):    [  ] Does not meet standards (specify reason) ________________________________________________________________________________  [  ] Meets standards in 49 .41; qualifies for 2-year certificate  [ x ] Meets standards, but periodic monitoring required (specify reason) _HTN. DM __________________________________________________________         qualified for:   [  ] 3 months               [  ] 6 months               [x  ] 1 year            [  ] other (specify): ________________________________  [  ]  Wearing corrective lenses        [  ] Wearing hearing aid        [  ] Accompanied by a waiver/exception(specify type): ____________________  [  ] Accompanied by a Skill Performance Evaluation (SPE) Certificate    [  ]  Qualified by operation of 49 .64 (Federal)  [  ] Driving within an exempt intracity zone (see 49 .62) (Federal)  [  ] Determination pending (specify reason) __________________________________________________________________________________        [  ] Return to medical exam office for follow-up on (must be 45 days or less _______________________________        [  ] Medical Examination Report amended (specify reason) ______________________________________________                    (if amended) Medical Examiner's Signature _____________________________________________________ Date: _______________  [  ] Incomplete examination (specify reason): _________________________________________________________________________________            If the  meets the standards outlined in 49 .41, then complete a Medical Examiner's  Certificate as stated in 49 .43(h), as appropriate.     I have performed this evaluation for certification. I have personally reviewed all available records and recorded information pertaining to this evaluation, and attest that to the best of my knowledge, I believe it to be true and correct.    Medical Examiner's Signature: _________________________________________                                                                                   (if printed)    Medical Examiner's Name: Alva Mohan PA-C  Medical Examiner's Address:   [address] [id number] [phone]  Dept: [phone]    Date Certificate Signed: 10/29/2020    Medical Examiner's State License, Certificate, or Registration Number: [id number]   Issuing State:  [place]    [  ] M.KAMALJIT.   [  ] D.O.   [ x ] Physician Assistant   [  ] Chiropractor   [  ] Advanced Practice Nurse  [  ] Other Practitioner (specify) __________________________________________________    National Registry Number:  [id number]               Medical Examiner's Certificate Expiration Date: 10/29/2021                              Date submitted to registry: 10/29/2020  Submitted by: Checo Monreal MA

## 2021-02-17 ENCOUNTER — OFFICE VISIT (OUTPATIENT)
Dept: FAMILY MEDICINE | Facility: CLINIC | Age: 86
End: 2021-02-17

## 2021-02-17 DIAGNOSIS — Z02.89 HEALTH EXAMINATION OF DEFINED SUBPOPULATION: Primary | ICD-10-CM

## 2021-02-17 PROCEDURE — 99499 UNLISTED E&M SERVICE: CPT | Performed by: FAMILY MEDICINE

## 2021-02-17 PROCEDURE — 81003 URINALYSIS AUTO W/O SCOPE: CPT | Performed by: FAMILY MEDICINE
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
## 2023-08-23 ENCOUNTER — OFFICE VISIT (OUTPATIENT)
Dept: FAMILY MEDICINE | Facility: CLINIC | Age: 88
End: 2023-08-23

## 2023-08-23 VITALS
DIASTOLIC BLOOD PRESSURE: 70 MMHG | WEIGHT: 244.3 LBS | SYSTOLIC BLOOD PRESSURE: 120 MMHG | HEIGHT: 65 IN | OXYGEN SATURATION: 94 % | TEMPERATURE: 97.5 F | RESPIRATION RATE: 12 BRPM | BODY MASS INDEX: 40.7 KG/M2 | HEART RATE: 86 BPM

## 2023-08-23 DIAGNOSIS — E11.9 TYPE 2 DIABETES MELLITUS WITHOUT COMPLICATION, WITH LONG-TERM CURRENT USE OF INSULIN (H): ICD-10-CM

## 2023-08-23 DIAGNOSIS — Z79.4 TYPE 2 DIABETES MELLITUS WITHOUT COMPLICATION, WITH LONG-TERM CURRENT USE OF INSULIN (H): ICD-10-CM

## 2023-08-23 DIAGNOSIS — G47.33 OSA (OBSTRUCTIVE SLEEP APNEA): ICD-10-CM

## 2023-08-23 DIAGNOSIS — Z02.89 HEALTH EXAMINATION OF DEFINED SUBPOPULATION: Primary | ICD-10-CM

## 2023-08-23 DIAGNOSIS — Z02.89 ENCOUNTER FOR DOT (DIRECTLY OBSERVED THERAPY): ICD-10-CM

## 2023-08-23 DIAGNOSIS — I10 PRIMARY HYPERTENSION: ICD-10-CM

## 2023-08-23 DIAGNOSIS — G37.3 TRANSVERSE MYELITIS (H): ICD-10-CM

## 2023-08-23 LAB
ALBUMIN UR-MCNC: NEGATIVE MG/DL
APPEARANCE UR: CLEAR
BILIRUB UR QL STRIP: NEGATIVE
COLOR UR AUTO: YELLOW
GLUCOSE UR STRIP-MCNC: NEGATIVE MG/DL
HGB UR QL STRIP: NEGATIVE
KETONES UR STRIP-MCNC: NEGATIVE MG/DL
LEUKOCYTE ESTERASE UR QL STRIP: NEGATIVE
NITRATE UR QL: NEGATIVE
PH UR STRIP: 5.5 [PH] (ref 5–8)
SP GR UR STRIP: 1.01 (ref 1–1.03)
UROBILINOGEN UR STRIP-ACNC: 0.2 E.U./DL

## 2023-08-23 PROCEDURE — 99499 UNLISTED E&M SERVICE: CPT | Performed by: FAMILY MEDICINE

## 2023-08-23 PROCEDURE — 81003 URINALYSIS AUTO W/O SCOPE: CPT | Performed by: FAMILY MEDICINE

## 2023-08-23 RX ORDER — AMOXICILLIN 250 MG
1 CAPSULE ORAL DAILY
COMMUNITY

## 2023-08-23 RX ORDER — METOPROLOL SUCCINATE 25 MG/1
25 TABLET, EXTENDED RELEASE ORAL DAILY
COMMUNITY

## 2023-08-23 RX ORDER — LISINOPRIL 40 MG/1
40 TABLET ORAL DAILY
COMMUNITY

## 2023-08-23 RX ORDER — HYDROCHLOROTHIAZIDE 25 MG/1
25 TABLET ORAL DAILY
COMMUNITY

## 2023-08-23 RX ORDER — BACLOFEN 20 MG/1
20 TABLET ORAL 3 TIMES DAILY
COMMUNITY

## 2023-08-23 RX ORDER — CHOLECALCIFEROL (VITAMIN D3) 50 MCG
1 TABLET ORAL DAILY
COMMUNITY

## 2023-08-23 RX ORDER — TERAZOSIN 2 MG/1
2 CAPSULE ORAL AT BEDTIME
COMMUNITY

## 2023-08-23 RX ORDER — TAMSULOSIN HYDROCHLORIDE 0.4 MG/1
0.4 CAPSULE ORAL 2 TIMES DAILY
COMMUNITY

## 2023-08-23 RX ORDER — CLOBETASOL PROPIONATE 0.5 MG/G
OINTMENT TOPICAL 2 TIMES DAILY
COMMUNITY

## 2023-08-23 RX ORDER — ASPIRIN 81 MG/1
81 TABLET ORAL DAILY
COMMUNITY

## 2023-08-23 RX ORDER — GABAPENTIN 400 MG/1
800 CAPSULE ORAL 3 TIMES DAILY
COMMUNITY

## 2023-08-23 RX ORDER — ZINC GLUCONATE 50 MG
50 TABLET ORAL DAILY
COMMUNITY

## 2023-08-23 RX ORDER — FOLIC ACID/MULTIVIT,IRON,MINER 0.4MG-18MG
TABLET ORAL
COMMUNITY

## 2023-08-23 RX ORDER — NITROFURANTOIN MACROCRYSTALS 50 MG/1
50 CAPSULE ORAL 4 TIMES DAILY
COMMUNITY

## 2023-08-23 RX ORDER — ROSUVASTATIN CALCIUM 20 MG/1
20 TABLET, COATED ORAL DAILY
COMMUNITY

## 2023-08-23 RX ORDER — METFORMIN HCL 500 MG
2000 TABLET, EXTENDED RELEASE 24 HR ORAL DAILY
COMMUNITY

## 2023-08-23 NOTE — PROGRESS NOTES
"Form [id number]                                  [facility] [id number]  Expiration Date: 3/31/2025  MEDICAL EXAMINATION REPORT FORM  (FOR  MEDICAL CERTIFICATION)    SECTION 1.  Information (to be filled out by ) please see scanned form         TESTING     /70 (BP Location: Left arm, Patient Position: Sitting, Cuff Size: Adult Large)   Pulse 86   Temp 97.5  F (36.4  C) (Temporal)   Resp 12   Ht 1.638 m (5' 4.5\")   Wt 110.8 kg (244 lb 4.8 oz)   SpO2 94%   BMI 41.29 kg/m         Urinalysis  Urinalysis is required. Numerical readings must be recorded.  Urine Specimen Specific Gravity Protein Blood Sugar    >1.030 NEGATIVE NEGATIVE NEGATIVE   Protein, blood or sugar in the urine may be an indication for further testing to rule out any underlying medical problem.           PHYSICAL EXAMINATION  The presence of a certain condition may not necessarily disqualify a , particularly if the condition is controlled adequately, is not likely to worsen, or is readily amenable to treatment. Even if a condition does not disqualify a , the Medical Examiner may consider deferring the  temporarily. Also, the  should be advised to take the necessary steps to correct the condition as soon as possible, particularly if neglecting the condition, could result in more serious illness that might affect driving.      Discuss any abnormal answers in detail in the space below and indicate whether it would affect the 's ability to operate a CMV. Enter applicable item number before each comment.    SEE SCANNED FORMS            Please complete only one of the following (Federal or State) Medical Examiner Determination sections:      MEDICAL EXAMINER DETERMINATION (State)  Use this section for examinations performed in accordance with the Federal Motor Carrier Safety Regulations (49 ..49) with any applicable State variances (which will only be valid for intrastate " operations):    [  ] Does not meet standards in 49 .41 with any applicable State variances (specify reason) __________________________________  [  X] Meets standards in 49 .41 with any applicable State variances  [  ] Meets standards, but periodic monitoring required (specify reason): _________________________________________________________         qualified for:   [  ] 3 months        [  ] 6 months        [  ] 1 year        [  ] other (specify type): ___________________________________  [ X ] Wearing corrective lenses       [  ] Wearing hearing aid        [  ] Accompanied by a waiver/exemption (specify type): __________________  [  ] Accompanied by a Skill Performance Evaluation (SPE) Certificate    [  ]  Grandfathered from State requirements (State)            If the  meets the standards outlined in 49 .41, with applicable State variances, then complete a Medical Examiner's Certificate, as appropriate.     I have performed this evaluation for certification. I have personally reviewed all available records and recorded information pertaining to this evaluation, and attest that to the best of my knowledge, I believe it to be true and correct.    Medical Examiner's Signature: ___Brenda Berry MD ______________________________________                                                                                   (if printed)    Medical Examiner's Name: Brenda Berry MD  Medical Examiner's Address:   [address] [address] [phone]  Dept: [phone]    Date Certificate Signed: 8/23/2023     Medical Examiner's State License, Certificate, or Registration Number: [id number]    Issuing State:  [place]    [ X ] M.D.   [  ] D.OJamarcus   [  ] Physician Assistant   [  ] Chiropractor   [  ] Advanced Practice Nurse  [  ] Other Practitioner (specify) __________________________________________________    National Registry Number:                  Medical Examiner's Certificate Expiration Date: 8/23/23                           If the  meets the standards outlined in 49 .41, with applicable State variances, then complete a Medical Examiner's Certificate, as appropriate.     I have performed this evaluation for certification. I have personally reviewed all available records and recorded information pertaining to this evaluation, and attest that to the best of my knowledge, I believe it to be true and correct.    Medical Examiner's Signature: _________Brenda Berry MD________________________________                                                                                   (if printed)    Medical Examiner's Name: Brenda Berry MD   Medical Examiner's Address:   Brenda Berry MD 8/23/2023 7:33 AM   [facility].  [phone]   Medical Examiner's State License, Certificate, or Registration Number: [id number]    Issuing State:  [place]    [X  ] M.D.   [  ] D.O.   [  ] Physician Assistant   [  ] Chiropractor   [  ] Advanced Practice Nurse  [  ] Other Practitioner (specify) __________________________________________________    National Registry Number:                  Medical Examiner's Certificate Expiration Date: 8/23/25                      Date submitted to registry:  8/23/23  Submitted by:  Marsha GUPTA

## 2025-08-20 ENCOUNTER — OFFICE VISIT (OUTPATIENT)
Dept: INTERNAL MEDICINE | Facility: CLINIC | Age: OVER 89
End: 2025-08-20

## 2025-08-20 DIAGNOSIS — Z02.89 ENCOUNTER FOR EXAMINATION REQUIRED BY DEPARTMENT OF TRANSPORTATION (DOT): Primary | ICD-10-CM

## 2025-08-20 PROBLEM — G47.33 OSA (OBSTRUCTIVE SLEEP APNEA): Status: RESOLVED | Noted: 2023-08-23 | Resolved: 2025-08-20

## 2025-08-20 PROBLEM — I10 PRIMARY HYPERTENSION: Status: RESOLVED | Noted: 2023-08-23 | Resolved: 2025-08-20

## 2025-08-20 PROBLEM — Z79.4 TYPE 2 DIABETES MELLITUS WITHOUT COMPLICATION, WITH LONG-TERM CURRENT USE OF INSULIN (H): Status: RESOLVED | Noted: 2023-08-23 | Resolved: 2025-08-20

## 2025-08-20 PROBLEM — E11.9 TYPE 2 DIABETES MELLITUS WITHOUT COMPLICATION, WITH LONG-TERM CURRENT USE OF INSULIN (H): Status: RESOLVED | Noted: 2023-08-23 | Resolved: 2025-08-20

## 2025-08-20 PROBLEM — G37.3 TRANSVERSE MYELITIS (H): Status: RESOLVED | Noted: 2023-08-23 | Resolved: 2025-08-20

## 2025-08-20 LAB
ALBUMIN UR-MCNC: NEGATIVE MG/DL
APPEARANCE UR: CLEAR
BILIRUB UR QL STRIP: NEGATIVE
COLOR UR AUTO: YELLOW
GLUCOSE UR STRIP-MCNC: NEGATIVE MG/DL
HGB UR QL STRIP: NEGATIVE
KETONES UR STRIP-MCNC: NEGATIVE MG/DL
LEUKOCYTE ESTERASE UR QL STRIP: NEGATIVE
NITRATE UR QL: NEGATIVE
PH UR STRIP: 5.5 [PH] (ref 5–8)
SP GR UR STRIP: <=1.005 (ref 1–1.03)
UROBILINOGEN UR STRIP-ACNC: 0.2 E.U./DL

## 2025-08-20 PROCEDURE — 81003 URINALYSIS AUTO W/O SCOPE: CPT | Performed by: NURSE PRACTITIONER
